# Patient Record
Sex: MALE | Race: WHITE
[De-identification: names, ages, dates, MRNs, and addresses within clinical notes are randomized per-mention and may not be internally consistent; named-entity substitution may affect disease eponyms.]

---

## 2021-12-07 ENCOUNTER — HOSPITAL ENCOUNTER (EMERGENCY)
Dept: HOSPITAL 41 - JD.ED | Age: 76
Discharge: HOME | End: 2021-12-07
Payer: COMMERCIAL

## 2021-12-07 DIAGNOSIS — Z79.899: ICD-10-CM

## 2021-12-07 DIAGNOSIS — I10: ICD-10-CM

## 2021-12-07 DIAGNOSIS — Z20.822: ICD-10-CM

## 2021-12-07 DIAGNOSIS — Z79.84: ICD-10-CM

## 2021-12-07 DIAGNOSIS — H49.22: Primary | ICD-10-CM

## 2021-12-07 DIAGNOSIS — E11.9: ICD-10-CM

## 2021-12-07 DIAGNOSIS — E78.00: ICD-10-CM

## 2021-12-07 DIAGNOSIS — E83.42: ICD-10-CM

## 2021-12-07 PROCEDURE — G0008 ADMIN INFLUENZA VIRUS VAC: HCPCS

## 2021-12-07 PROCEDURE — 85025 COMPLETE CBC W/AUTO DIFF WBC: CPT

## 2021-12-07 PROCEDURE — 70498 CT ANGIOGRAPHY NECK: CPT

## 2021-12-07 PROCEDURE — 85610 PROTHROMBIN TIME: CPT

## 2021-12-07 PROCEDURE — 93005 ELECTROCARDIOGRAM TRACING: CPT

## 2021-12-07 PROCEDURE — 71045 X-RAY EXAM CHEST 1 VIEW: CPT

## 2021-12-07 PROCEDURE — 70450 CT HEAD/BRAIN W/O DYE: CPT

## 2021-12-07 PROCEDURE — 83735 ASSAY OF MAGNESIUM: CPT

## 2021-12-07 PROCEDURE — 82553 CREATINE MB FRACTION: CPT

## 2021-12-07 PROCEDURE — 70544 MR ANGIOGRAPHY HEAD W/O DYE: CPT

## 2021-12-07 PROCEDURE — 85730 THROMBOPLASTIN TIME PARTIAL: CPT

## 2021-12-07 PROCEDURE — 82947 ASSAY GLUCOSE BLOOD QUANT: CPT

## 2021-12-07 PROCEDURE — 99284 EMERGENCY DEPT VISIT MOD MDM: CPT

## 2021-12-07 PROCEDURE — 87635 SARS-COV-2 COVID-19 AMP PRB: CPT

## 2021-12-07 PROCEDURE — 84484 ASSAY OF TROPONIN QUANT: CPT

## 2021-12-07 PROCEDURE — 83880 ASSAY OF NATRIURETIC PEPTIDE: CPT

## 2021-12-07 PROCEDURE — 86140 C-REACTIVE PROTEIN: CPT

## 2021-12-07 PROCEDURE — U0002 COVID-19 LAB TEST NON-CDC: HCPCS

## 2021-12-07 PROCEDURE — 36415 COLL VENOUS BLD VENIPUNCTURE: CPT

## 2021-12-07 PROCEDURE — 90694 VACC AIIV4 NO PRSRV 0.5ML IM: CPT

## 2021-12-07 PROCEDURE — 70551 MRI BRAIN STEM W/O DYE: CPT

## 2021-12-07 PROCEDURE — 90471 IMMUNIZATION ADMIN: CPT

## 2021-12-07 PROCEDURE — 80053 COMPREHEN METABOLIC PANEL: CPT

## 2022-09-29 ENCOUNTER — TRANSFERRED RECORDS (OUTPATIENT)
Dept: HEALTH INFORMATION MANAGEMENT | Facility: CLINIC | Age: 77
End: 2022-09-29

## 2022-10-26 ENCOUNTER — TRANSCRIBE ORDERS (OUTPATIENT)
Dept: OTHER | Age: 77
End: 2022-10-26

## 2022-10-26 DIAGNOSIS — R29.810 FACIAL WEAKNESS: Primary | ICD-10-CM

## 2022-12-01 DIAGNOSIS — R29.810 FACIAL WEAKNESS: Primary | ICD-10-CM

## 2022-12-11 NOTE — PROGRESS NOTES
[facility] Neurology Consultation    Marshal Navas MRN# [id number]   Age: 77 year old YOB: 1945     Requesting physician: Provider Not In System  [place] [facility]     Reason for Consultation: episodic double vision and dizziness      History of Presenting Symptoms:   Marshal Navas is a 77 year old male who presents today for evaluation of episodic double vision and dizziness.     In December 2021 patient woke up with acute onset double vision. He was found have bilateral VI nerve palsies by examination. MRI brain and lab work did not show an etiology of symptoms. He was given prism glasses. Symptoms improved over a few months.     In late September 2021 patient had acute onset of symptoms as follows. He woke up with severe dizziness and double vision. He was unable to sit up and he felt nausea, but he wasn't throwing up. He went to the ER in [facility]. He had right facial droop and abnormal eye movement's on exam. It is unclear how chronic facial droop is, however, as patient did not notice clear worsening. 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 He went a little better at discharge. The double vision improved in about 2 weeks. The dizziness has slowly improved over the last few months, but is not completely back to normal.     Dizziness is only with getting up and walking around. He feels lightheaded and a sense of imbalance with walking. He previously had more of a moving sensation, but not a spinning sensation. He doesn't feel like he's going to pass. He had a fall about a month ago. He used a walker for about 3 weeks in September and then was given a cane for about 3 weeks, but has been walking without assistance since then.    Patient has had decreased appetite over about the last 6 month and has lost about 50 pounds. This worsened after hospitalization. His appetite still hasn't returned.     He has cotton mouth sensation,  which makes it hard to swallow. He has hard swallowing things like pulled pork.     Patient reports he has longstanding neuropathy with numbness in the feet that started around chemotherapy for Hodgkin's Lymphoma in the late 90s. He was also diagnosed with diabetes around this time. He doesn't believe numbness has worsened with time. He had recurrence of Lymphoma about 5 years ago and was temporarily treated with Rituximab. He has not had a recurrence since then.       Past Medical History:   There is no problem list on file for this patient.    No past medical history on file.     Past Surgical History:   No past surgical history on file.     Social History:     Social History     Tobacco Use     Smoking status: Never     Smokeless tobacco: Never        Family History:   No family history on file.     Medications:     Current Outpatient Medications   Medication Sig     aspirin (ASA) 81 MG EC tablet Take 81 mg by mouth daily     levothyroxine (TIROSINT-SOL) 88 MCG/ML SOLN suspension Take 88 mcg by mouth daily     metFORMIN (GLUCOPHAGE) 500 MG tablet Take 500 mg by mouth 2 times daily (with meals)     Multiple Vitamin (MULTIVITAMIN PO)      tamsulosin (FLOMAX) 0.4 MG capsule Take 0.4 mg by mouth daily     No current facility-administered medications for this visit.        Allergies:   No Known Allergies     Review of Systems:   As above     Physical Exam:   Vitals: BP (!) 166/96   Pulse 103   SpO2 99%    BP sitting 144/86 HR 99 / BP standing 119/79  (2 minutes) / BP standing 129/85  (3-4 minutes)  General: Seated comfortably in no acute distress.  HEENT: Right optic discs sharp on funduscopic exam, left not well visualized.   Lungs: breathing comfortably  Extremities: no edema  Skin: No rashes on exposed  Neurologic:     Mental Status: Fully alert, attentive. Normal memory and fund of knowledge. Language normal, speech clear and fluent, no paraphasic errors.      Cranial Nerves: Visual fields intact.  PERRL. EOMI. Continuous right beating nystagmus on right horizontal gaze, more subtle left beating on left horizontal gaze. Facial sensation intact/symmetric. Right eyelid mildly drooping compared to left and right eyebrow mildly lowed at baseline, but full raise. Right eye closure mildly weak compared to left. Lower face is symmetric and strength intact. Hearing not formally tested but intact to conversation. Palate elevation symmetric, uvula midline. No dysarthria. Shoulder shrug strong bilaterally. Tongue protrusion midline.     Motor: No resting tremors, minimal postural/action tremors in upper extremities. Muscle tone normal throughout. Normal/symmetric rapid finger tapping. Strength 5/5 throughout upper and lower extremities as below.      Right Left   Shoulder abduction        5 5   Elbow extension 5 5   Elbow flexion 5 5   Wrist extension         5 5   Finger extension 5 5   ADM 5 5   FDI 5 5   APB 5 5   Hip flexion 5 5   Knee flexion 5 5   Knee extension 5 5   Dorsiflexion 5 5   Plantar flexion 5 5        Deep Tendon Reflexes:     Right Left   Biceps trace trace   BRD trace trace   Triceps 1 1   Patellar 1 1   Achilles trace trace   Plantar equivocal equivocal   Clonus Absent absent        Sensory: Intact/symmetric to proprioception throughout upper and lower extremities. Light touch is decreased in the bilateral toes. Vibration is absent in the bilateral great toes and left ankle, ~5 seconds at right ankle, ~12 seconds at thumb. Negative Romberg.      Coordination: Finger-nose-finger and heel-shin intact without dysmetria. Rapid alternating movements intact/symmetric with normal speed and rhythm.     Gait: Mildly cautious, but otherwise normal steady casual gait. Able to walk on toes, heels with mild difficulty. Able to take 2-3 tandem steps before stepping off.         Data: Pertinent prior to visit   12/7/2021 MRI brain: unremarkable  12/7/2021 CTA neck: unremarkable  12/13/2021 negative AChR blocking  and binding and modulating AB, MUSK Ab.  10/20/2021 A1C 7.3%, LDL 80.    2/1/2022 A1c 7.8%, LDL 68.    CSF 10/2022: protein 60, normal Cytology/Cytometry/paraneoplasticpanel/VDRL, WBC 7, glucose 99    10/2022 Lyme Ab negative, ganglioside Ab panel negative, AChR binding negative    MRI brain 10/2022  FINDINGS: No restricted diffusion to suggest acute ischemia. No abnormal magnetic susceptibility or enhancement. Minimal T2/FLAIR periventricular hyperintensities likely representing chronic small vessel ischemic change. Normal caliber ventricles. No CP angle mass. Normal orbits. Intracranial vascular flow voids are preserved. The paranasal sinuses and mastoid air cells are clear.   IMPRESSION: No acute intracranial abnormality.     MRI brain 9/2022  IMPRESSION: No acute intracranial abnormality.     CTA head 9/2022  IMPRESSION:   1. No significant vascular cutoff or aneurysm.   2. No significant stenosis within the intracranial and extracranial arterial circulation.   3. If further evaluation is needed, consider MRI.     CT chest 10/2022  1. No acute findings of the chest.     CT abdomen and pelvis 10/2022   1. No acute findings of the abdomen and pelvis.   2. Stable pelvic lymphadenopathy.     EMG 12/12/2022  Interpretation:  This is an abnormal examination. There is electrophysiologic evidence of the following:  - Mildly severe right sided ulnar mononeuropathy at the elbow  - Moderately severe right median mononeuropathy at the wrist (carpal tunnel syndrome)  - Mild non-length dependent sensory polyneuropathy  - Mild chronic inactive C5-C8 polyradiculopathy  - Mild chronic inactive L4-S1 polyradiculopathy  Comment: There is no electrophysiologic evidence of disorder of neuromuscular transmission as clinically questioned.          Assessment and Plan:   Assessment:  Marshal Navas is a 77 year old male with history of Hodgkin's lymphoma in remission who presents today for evaluation of episodic double vision and  dizziness. Patient had acute onset double vision in 12/2021 with examination suggestive of bilateral VI nerve palsies. Symptoms improved over a few months. In 9/2022 he had recurrence of double vision with dizziness and facial droop (unclear if facial droop was pre-existing). He was treated with 5 days of IVIG for possible Cortez Betancur variant. Patient has had improvement in symptoms over weeks to months, but is not completely back to baseline.     He has had extensive work-up as above including EMG today. Myasthenia antibodies have been negative and repetitive stimulation was unremarkable today. Due to acute nature of symptoms with improvement over several months, I would suspect inflammatory causes of polyneuropathy to be most likely explanation. AIDP variant such as Cortez Betancur syndrome is a consideration. Ganglioside antibodies were negative. Mild non-length dependent sensory polyneuropathy was seen on today's EMG, which can be seen in AIDP and it's variants. Causes of sensory neuropathy were also checked as below, but this is less likely. Diabetes can sometimes cause recurrent cranial mononeuropathies and this is a consideration as well. Regardless, it is reassuring symptoms are improving and I wouldn't recommend any treatment interventions besides continued therapy at this time. If symptoms recur, patient would likely need repeat testing including CSF sampling, MRI brain, and EMG.      Plan:  - Serum A1c, paraneoplastic panel, B6, B12, SSA, SSB, ESR, CRP, ELP, immunofixation  - Continue PT exercises  - Call with worsening symptoms    Follow up in Neurology clinic as needed should new concerns arise.    Brad Tracy MD   of Neurology  [facility]      The total time of this encounter today amounted to 100 minutes. This time included time spent with the patient, prep work, ordering tests, and performing post visit documentation.

## 2022-12-12 ENCOUNTER — PRE VISIT (OUTPATIENT)
Dept: NEUROLOGY | Facility: CLINIC | Age: 77
End: 2022-12-12

## 2022-12-12 ENCOUNTER — OFFICE VISIT (OUTPATIENT)
Dept: NEUROLOGY | Facility: CLINIC | Age: 77
End: 2022-12-12
Payer: COMMERCIAL

## 2022-12-12 VITALS — SYSTOLIC BLOOD PRESSURE: 166 MMHG | DIASTOLIC BLOOD PRESSURE: 96 MMHG | HEART RATE: 103 BPM | OXYGEN SATURATION: 99 %

## 2022-12-12 DIAGNOSIS — G62.9 NEUROPATHY: Primary | ICD-10-CM

## 2022-12-12 DIAGNOSIS — R29.810 FACIAL WEAKNESS: ICD-10-CM

## 2022-12-12 PROCEDURE — 99417 PROLNG OP E/M EACH 15 MIN: CPT | Performed by: INTERNAL MEDICINE

## 2022-12-12 PROCEDURE — 95911 NRV CNDJ TEST 9-10 STUDIES: CPT | Performed by: INTERNAL MEDICINE

## 2022-12-12 PROCEDURE — 99205 OFFICE O/P NEW HI 60 MIN: CPT | Performed by: INTERNAL MEDICINE

## 2022-12-12 PROCEDURE — 95886 MUSC TEST DONE W/N TEST COMP: CPT | Performed by: INTERNAL MEDICINE

## 2022-12-12 PROCEDURE — 95937 NEUROMUSCULAR JUNCTION TEST: CPT | Performed by: INTERNAL MEDICINE

## 2022-12-12 RX ORDER — TAMSULOSIN HYDROCHLORIDE 0.4 MG/1
0.4 CAPSULE ORAL DAILY
COMMUNITY

## 2022-12-12 ASSESSMENT — PAIN SCALES - GENERAL: PAINLEVEL: NO PAIN (0)

## 2022-12-12 NOTE — LETTER
12/12/2022       RE: Marshal Navas  [address] [place] [address]     Dear Colleague,    Thank you for referring your patient, Marshal Navas, to the [facility]. Please see a copy of my visit note below.    [facility] Neurology Consultation    Marshal Navas MRN# [id number]   Age: 77 year old YOB: 1945     Requesting physician: Provider Not In System  [facility] - [facility]     Reason for Consultation: episodic double vision and dizziness      History of Presenting Symptoms:   Marshal Navas is a 77 year old male who presents today for evaluation of episodic double vision and dizziness.     In December 2021 patient woke up with acute onset double vision. He was found have bilateral VI nerve palsies by examination. MRI brain and lab work did not show an etiology of symptoms. He was given prism glasses. Symptoms improved over a few months.     In late September 2021 patient had acute onset of symptoms as follows. He woke up with severe dizziness and double vision. He was unable to sit up and he felt nausea, but he wasn't throwing up. He went to the ER in [facility]. He had right facial droop and abnormal eye movement's on exam. It is unclear how chronic facial droop is, however, as patient did not notice clear worsening. 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 He went a little better at discharge. The double vision improved in about 2 weeks. The dizziness has slowly improved over the last few months, but is not completely back to normal.     Dizziness is only with getting up and walking around. He feels lightheaded and a sense of imbalance with walking. He previously had more of a moving sensation, but not a spinning sensation. He doesn't feel like he's going to pass. He had a fall about a month ago. He used a walker for about 3 weeks in  September and then was given a cane for about 3 weeks, but has been walking without assistance since then.    Patient has had decreased appetite over about the last 6 month and has lost about 50 pounds. This worsened after hospitalization. His appetite still hasn't returned.     He has cotton mouth sensation, which makes it hard to swallow. He has hard swallowing things like pulled pork.     Patient reports he has longstanding neuropathy with numbness in the feet that started around chemotherapy for Hodgkin's Lymphoma in the late 90s. He was also diagnosed with diabetes around this time. He doesn't believe numbness has worsened with time. He had recurrence of Lymphoma about 5 years ago and was temporarily treated with Rituximab. He has not had a recurrence since then.       Past Medical History:   There is no problem list on file for this patient.    No past medical history on file.     Past Surgical History:   No past surgical history on file.     Social History:     Social History     Tobacco Use     Smoking status: Never     Smokeless tobacco: Never        Family History:   No family history on file.     Medications:     Current Outpatient Medications   Medication Sig     aspirin (ASA) 81 MG EC tablet Take 81 mg by mouth daily     levothyroxine (TIROSINT-SOL) 88 MCG/ML SOLN suspension Take 88 mcg by mouth daily     metFORMIN (GLUCOPHAGE) 500 MG tablet Take 500 mg by mouth 2 times daily (with meals)     Multiple Vitamin (MULTIVITAMIN PO)      tamsulosin (FLOMAX) 0.4 MG capsule Take 0.4 mg by mouth daily     No current facility-administered medications for this visit.        Allergies:   No Known Allergies     Review of Systems:   As above     Physical Exam:   Vitals: BP (!) 166/96   Pulse 103   SpO2 99%    BP sitting 144/86 HR 99 / BP standing 119/79  (2 minutes) / BP standing 129/85  (3-4 minutes)  General: Seated comfortably in no acute distress.  HEENT: Right optic discs sharp on funduscopic exam,  left not well visualized.   Lungs: breathing comfortably  Extremities: no edema  Skin: No rashes on exposed  Neurologic:     Mental Status: Fully alert, attentive. Normal memory and fund of knowledge. Language normal, speech clear and fluent, no paraphasic errors.      Cranial Nerves: Visual fields intact. PERRL. EOMI. Continuous right beating nystagmus on right horizontal gaze, more subtle left beating on left horizontal gaze. Facial sensation intact/symmetric. Right eyelid mildly drooping compared to left and right eyebrow mildly lowed at baseline, but full raise. Right eye closure mildly weak compared to left. Lower face is symmetric and strength intact. Hearing not formally tested but intact to conversation. Palate elevation symmetric, uvula midline. No dysarthria. Shoulder shrug strong bilaterally. Tongue protrusion midline.     Motor: No resting tremors, minimal postural/action tremors in upper extremities. Muscle tone normal throughout. Normal/symmetric rapid finger tapping. Strength 5/5 throughout upper and lower extremities as below.      Right Left   Shoulder abduction        5 5   Elbow extension 5 5   Elbow flexion 5 5   Wrist extension         5 5   Finger extension 5 5   ADM 5 5   FDI 5 5   APB 5 5   Hip flexion 5 5   Knee flexion 5 5   Knee extension 5 5   Dorsiflexion 5 5   Plantar flexion 5 5        Deep Tendon Reflexes:     Right Left   Biceps trace trace   BRD trace trace   Triceps 1 1   Patellar 1 1   Achilles trace trace   Plantar equivocal equivocal   Clonus Absent absent        Sensory: Intact/symmetric to proprioception throughout upper and lower extremities. Light touch is decreased in the bilateral toes. Vibration is absent in the bilateral great toes and left ankle, ~5 seconds at right ankle, ~12 seconds at thumb. Negative Romberg.      Coordination: Finger-nose-finger and heel-shin intact without dysmetria. Rapid alternating movements intact/symmetric with normal speed and rhythm.      Gait: Mildly cautious, but otherwise normal steady casual gait. Able to walk on toes, heels with mild difficulty. Able to take 2-3 tandem steps before stepping off.         Data: Pertinent prior to visit   12/7/2021 MRI brain: unremarkable  12/7/2021 CTA neck: unremarkable  12/13/2021 negative AChR blocking and binding and modulating AB, MUSK Ab.  10/20/2021 A1C 7.3%, LDL 80.    2/1/2022 A1c 7.8%, LDL 68.    CSF 10/2022: protein 60, normal Cytology/Cytometry/paraneoplasticpanel/VDRL, WBC 7, glucose 99    10/2022 Lyme Ab negative, ganglioside Ab panel negative, AChR binding negative    MRI brain 10/2022  FINDINGS: No restricted diffusion to suggest acute ischemia. No abnormal magnetic susceptibility or enhancement. Minimal T2/FLAIR periventricular hyperintensities likely representing chronic small vessel ischemic change. Normal caliber ventricles. No CP angle mass. Normal orbits. Intracranial vascular flow voids are preserved. The paranasal sinuses and mastoid air cells are clear.   IMPRESSION: No acute intracranial abnormality.     MRI brain 9/2022  IMPRESSION: No acute intracranial abnormality.     CTA head 9/2022  IMPRESSION:   1. No significant vascular cutoff or aneurysm.   2. No significant stenosis within the intracranial and extracranial arterial circulation.   3. If further evaluation is needed, consider MRI.     CT chest 10/2022  1. No acute findings of the chest.     CT abdomen and pelvis 10/2022   1. No acute findings of the abdomen and pelvis.   2. Stable pelvic lymphadenopathy.     EMG 12/12/2022  Interpretation:  This is an abnormal examination. There is electrophysiologic evidence of the following:  - Mildly severe right sided ulnar mononeuropathy at the elbow  - Moderately severe right median mononeuropathy at the wrist (carpal tunnel syndrome)  - Mild non-length dependent sensory polyneuropathy  - Mild chronic inactive C5-C8 polyradiculopathy  - Mild chronic inactive L4-S1  polyradiculopathy  Comment: There is no electrophysiologic evidence of disorder of neuromuscular transmission as clinically questioned.          Assessment and Plan:   Assessment:  Marshal Navas is a 77 year old male with history of Hodgkin's lymphoma in remission who presents today for evaluation of episodic double vision and dizziness. Patient had acute onset double vision in 12/2021 with examination suggestive of bilateral VI nerve palsies. Symptoms improved over a few months. In 9/2022 he had recurrence of double vision with dizziness and facial droop (unclear if facial droop was pre-existing). He was treated with 5 days of IVIG for possible Cortez Betancur variant. Patient has had improvement in symptoms over weeks to months, but is not completely back to baseline.     He has had extensive work-up as above including EMG today. Myasthenia antibodies have been negative and repetitive stimulation was unremarkable today. Due to acute nature of symptoms with improvement over several months, I would suspect inflammatory causes of polyneuropathy to be most likely explanation. AIDP variant such as Cortez Betancur syndrome is a consideration. Ganglioside antibodies were negative. Mild non-length dependent sensory polyneuropathy was seen on today's EMG, which can be seen in AIDP and it's variants. Causes of sensory neuropathy were also checked as below, but this is less likely. Diabetes can sometimes cause recurrent cranial mononeuropathies and this is a consideration as well. Regardless, it is reassuring symptoms are improving and I wouldn't recommend any treatment interventions besides continued therapy at this time. If symptoms recur, patient would likely need repeat testing including CSF sampling, MRI brain, and EMG.      Plan:  - Serum A1c, paraneoplastic panel, B6, B12, SSA, SSB, ESR, CRP, ELP, immunofixation  - Continue PT exercises  - Call with worsening symptoms    Follow up in Neurology clinic as needed should  new concerns arise.      Brad Tracy MD   of Neurology  [facility]    The total time of this encounter today amounted to 100 minutes. This time included time spent with the patient, prep work, ordering tests, and performing post visit documentation.

## 2022-12-12 NOTE — PROGRESS NOTES
[facility]  Electrodiagnostic Laboratory                 Department of Neurology                                                                                                         Test Date:  2022    Patient: Marshal Navas : 1945 Physician: Brad Tracy MD   Sex: Male AGE: 77 year Ref Phys:    ID#: [id number]   Technician: Kristy Behling     Clinical Information:  Patient is a 78 y/o male who presents for evaluation of episodic double vision and imbalance. EMG ordered to evaluate for polyneuropathy and disorder of neuromuscular transmission.     Techniques:  Motor and sensory conduction studies were done with surface recording electrodes. EMG was done with a concentric needle electrode.     Results:  Evaluation of the right median-APB motor study showed prolonged distal latency and reduced amplitude. The right ulnar-ADM showed reduced conduction velocity across the elbow segment. The bilateral radial sensory responses showed mildly decreased amplitudes.     F Wave studies indicate that the right ulnar F wave has no response.  All remaining F Wave latencies were within normal limits.      Repetitive nerve stimulation of the right facial-nasalis and right ulnar-ADM were within normal limits.     Needle evaluation of the right deltoid muscle showed increased insertional activity and slightly increased motor unit duration.  The right Biceps Brachii, the right Vastus Lateralis, and the right Gluteus Medius muscles showed slightly increased motor unit amplitude and slightly increased motor unit duration.  The right Triceps Brachii and the right Gastrocnemius muscles showed slightly increased motor unit amplitude, slightly increased motor unit duration, and recruitment.  The right Ext Digitorum Communis muscle showed slightly increased motor unit duration.  The right First Dorsal Interosseous muscle showed moderately increased motor unit amplitude, moderately increased  motor unit duration, and recruitment.  The right Tibialis Anterior muscle showed slightly increased motor unit amplitude, moderately increased motor unit duration, and recruitment.  All remaining muscles (as indicated in the following table) showed no evidence of electrical instability.        Interpretation:  This is an abnormal examination. 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   ___________________________  Brad Tracy MD        Nerve Conduction Studies  Motor Sites      Latency Amplitude Neg. Amp Diff Segment Distance Velocity Neg. Dur Neg Area Diff Temperature Comment   Site (ms) Norm (mV) Norm %  cm m/s Norm ms %  C    Right Fibular (EDB) Motor   Ankle 3.6  < 6.0 3.5  > 2.0  Ankle-EDB 8   4.9  30.6    Bel Fib Head 12.0 - 2.2 - -37.1 Bel Fib Head-Ankle 40 48  > 38 5.8 -21.1 30.7    Pop Fossa 14.1 - 2.5 - 13.6 Pop Fossa-Bel Fib Head 8 38  > 38 5.9 8.5 30.7    Right Median (APB) Motor   Wrist 4.7  < 4.4 4.3  > 5.0  Wrist-APB 8   7.7  28.1    Elbow 9.4 - 4.4  < 54.0 2.3 Elbow-Wrist 24 51  > 48 7.0 -8.8 28    Right Tibial (AHB) Motor   Ankle 4.0  < 6.5 6.6  > 4.4  Ankle-AHB 8   6.6  30.9    Knee 14.2 - 4.2 - -36.4 Knee-Ankle 42 41  > 38 7.2 -25.8 30.8    Right Ulnar (ADM) Motor   Wrist 3.4  < 3.5 5.6  > 5.0  Wrist-ADM 8   6.5  27.6    Bel Elbow 8.0 - 5.0 - -10.7 Bel Elbow-Wrist 24 52  > 48 6.9 -8.5 27.5    Abv Elbow 11.0 - 4.9 - -2.0 Abv Elbow-Bel Elbow 10 33  > 48 7.7 0.49 27.6      Sensory Sites      Onset Lat Peak Lat Amp (O-P) Amp (P-P) Segment Distance Velocity Temperature  Comment   Site ms ms  V Norm  V  cm m/s Norm  C    Right Median Sensory   Wrist-Dig II 2.8 3.5 10  > 10 15 Wrist-Dig II 14 50  > 48 28.3    Left Radial Sensory   Forearm-Wrist 1.40 2.2 14  > 15 24 Forearm-Wrist 10 71 - 24.3    Right Radial Sensory   Forearm-Wrist 1.65 2.5 14  > 15 16 Forearm-Wrist 10 61 - 28.3    Right Superficial Fibular Sensory   14 cm-Ankle 2.9 3.7 4  > 3 4 14 cm-Ankle 12.5 43  > 38 30.8    Right Sural Sensory   Calf-Lat Mall 2.8 3.6 5  > 5 5 Calf-Lat Mall 14 50  > 38 31.3    Right Ulnar Sensory   Wrist-Dig V 2.6 3.8 8  > 8 10 Wrist-Dig V 12.5 48  > 48 28.4      F Wave Studies     Min-F Max-F Dispersion Persistence Mean-F F-Norm L-R Mean-F L-R Mean-F Norm F/M Ratio F-M Lat (ms)   Right Tibial (Abd Hallucis)  30.9  C   57.19 61.25 4.06 100.00 59.80 <61  <5.7 1.36 50.47   Right Ulnar (Abd Dig Min)  28.1  C      0.00  <36  <2.5       RNS     Trial # Label Amp 1 (mV)  O-P Amp 4 (mV)  O-P Amp % Dif Area 1 (mV ms) Area 4 (mV ms) Area % Dif Rep Rate Train Length Pause Time (min:sec) Comments   Right Abductor Digiti Minimi   Tr 1 Baseline 5.88 5.94 1.1 26.49 24.86 -6.1 3.00 6 00:30    Tr 2 Post Exercise 6.58 6.37 -3.1 28.98 26.35 -9.1 3.00 6 01:00    Tr 3 1 min Post 6.78 6.33 -6.5 31.01 27.44 -11.5 3.00 6 01:00    Tr 4 2 min Post 6.71 6.23 -7.2 29.90 26.98 -9.8 3.00 6 01:00    5 3 min Post       3.00 6 00:00    Right Nasalis   Tr 1 Baseline 1.27 1.36 7.5 4.45 4.48 0.6 3.00 6 00:30    Tr 2 Post Exercise 1.48 1.52 2.5 4.66 4.32 -7.2 3.00 6 01:00    Tr 3 1 min Post 1.41 1.53 8.5 5.16 4.76 -7.6 3.00 6 01:00    Tr 4 2 min Post 1.41 1.35 -4.3 5.04 4.96 -1.6 3.00 6 01:00    5 3 min Post       3.00 6 00:00        Electromyography     Side Muscle Ins Act Fibs/PSW Fasc HF Amp Dur Poly Recrt Int Pat   Right Deltoid Incr None Nml 0 Nml 1+ 0 Nml Nml   Right Biceps Nml None Nml 0 1+ 1+ 0 Nml Nml   Right Triceps Nml None Nml 0 1+ 1+ 0 Fartun Nml   Right EDC Nml None Nml 0 Nml 1+ 0 Nml Nml   Right FDI Nml None Nml 0 2+  2+ 0 Fartun Nml   Right Tib Anterior Nml None Nml 0 1+ 2+ 0 Fartun Nml   Right Gastroc Nml None Nml 0 1+ 1+ 0 Fartun Nml   Right Vastus Lat Nml None Nml 0 1+ 1+ 0 Nml Nml   Right Iliopsoas Nml None Nml 0 Nml Nml 0 Nml Nml   Right Gluteus Med Nml None Nml 0 1+ 1+ 0 Nml Nml         NCS Waveforms:    Motor                Sensory

## 2022-12-12 NOTE — LETTER
2022       RE: Marshal Navas  [address] [address]     Dear Colleague,    Thank you for referring your patient, Marshal Navas, to the [facility] EMG [facility] [place] at [facility]. Please see a copy of my visit note below.                        [facility]  Electrodiagnostic Laboratory                 Department of Neurology                                                                                                         Test Date:  2022    Patient: Marshal Navas : 1945 Physician: Brad Tracy MD   Sex: Male AGE: 77 year Ref Phys:    ID#: [id number]   Technician: Kristy Behling     Clinical Information:  Patient is a 78 y/o male who presents for evaluation of episodic double vision and imbalance. EMG ordered to evaluate for polyneuropathy and disorder of neuromuscular transmission.     Techniques:  Motor and sensory conduction studies were done with surface recording electrodes. EMG was done with a concentric needle electrode.     Results:  Evaluation of the right median-APB motor study showed prolonged distal latency and reduced amplitude. The right ulnar-ADM showed reduced conduction velocity across the elbow segment. The bilateral radial sensory responses showed mildly decreased amplitudes.     F Wave studies indicate that the right ulnar F wave has no response.  All remaining F Wave latencies were within normal limits.      Repetitive nerve stimulation of the right facial-nasalis and right ulnar-ADM were within normal limits.     Needle evaluation of the right deltoid muscle showed increased insertional activity and slightly increased motor unit duration.  The right Biceps Brachii, the right Vastus Lateralis, and the right Gluteus Medius muscles showed slightly increased motor unit amplitude and slightly increased motor unit duration.  The right Triceps Brachii and the right Gastrocnemius muscles  showed slightly increased motor unit amplitude, slightly increased motor unit duration, and recruitment.  The right Ext Digitorum Communis muscle showed slightly increased motor unit duration.  The right First Dorsal Interosseous muscle showed moderately increased motor unit amplitude, moderately increased motor unit duration, and recruitment.  The right Tibialis Anterior muscle showed slightly increased motor unit amplitude, moderately increased motor unit duration, and recruitment.  All remaining muscles (as indicated in the following table) showed no evidence of electrical instability.        Interpretation:  This is an abnormal examination. 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   ___________________________  Brad Tracy MD        Nerve Conduction Studies  Motor Sites      Latency Amplitude Neg. Amp Diff Segment Distance Velocity Neg. Dur Neg Area Diff Temperature Comment   Site (ms) Norm (mV) Norm %  cm m/s Norm ms %  C    Right Fibular (EDB) Motor   Ankle 3.6  < 6.0 3.5  > 2.0  Ankle-EDB 8   4.9  30.6    Bel Fib Head 12.0 - 2.2 - -37.1 Bel Fib Head-Ankle 40 48  > 38 5.8 -21.1 30.7    Pop Fossa 14.1 - 2.5 - 13.6 Pop Fossa-Bel Fib Head 8 38  > 38 5.9 8.5 30.7    Right Median (APB) Motor   Wrist 4.7  < 4.4 4.3  > 5.0  Wrist-APB 8   7.7  28.1    Elbow 9.4 - 4.4  < 54.0 2.3 Elbow-Wrist 24 51  > 48 7.0 -8.8 28    Right Tibial (AHB) Motor   Ankle 4.0  < 6.5 6.6  > 4.4  Ankle-AHB 8   6.6  30.9    Knee 14.2 - 4.2 - -36.4 Knee-Ankle 42 41  > 38 7.2 -25.8 30.8    Right Ulnar (ADM) Motor    Wrist 3.4  < 3.5 5.6  > 5.0  Wrist-ADM 8   6.5  27.6    Bel Elbow 8.0 - 5.0 - -10.7 Bel Elbow-Wrist 24 52  > 48 6.9 -8.5 27.5    Abv Elbow 11.0 - 4.9 - -2.0 Abv Elbow-Bel Elbow 10 33  > 48 7.7 0.49 27.6      Sensory Sites      Onset Lat Peak Lat Amp (O-P) Amp (P-P) Segment Distance Velocity Temperature Comment   Site ms ms  V Norm  V  cm m/s Norm  C    Right Median Sensory   Wrist-Dig II 2.8 3.5 10  > 10 15 Wrist-Dig II 14 50  > 48 28.3    Left Radial Sensory   Forearm-Wrist 1.40 2.2 14  > 15 24 Forearm-Wrist 10 71 - 24.3    Right Radial Sensory   Forearm-Wrist 1.65 2.5 14  > 15 16 Forearm-Wrist 10 61 - 28.3    Right Superficial Fibular Sensory   14 cm-Ankle 2.9 3.7 4  > 3 4 14 cm-Ankle 12.5 43  > 38 30.8    Right Sural Sensory   Calf-Lat Mall 2.8 3.6 5  > 5 5 Calf-Lat Mall 14 50  > 38 31.3    Right Ulnar Sensory   Wrist-Dig V 2.6 3.8 8  > 8 10 Wrist-Dig V 12.5 48  > 48 28.4      F Wave Studies     Min-F Max-F Dispersion Persistence Mean-F F-Norm L-R Mean-F L-R Mean-F Norm F/M Ratio F-M Lat (ms)   Right Tibial (Abd Hallucis)  30.9  C   57.19 61.25 4.06 100.00 59.80 <61  <5.7 1.36 50.47   Right Ulnar (Abd Dig Min)  28.1  C      0.00  <36  <2.5       RNS     Trial # Label Amp 1 (mV)  O-P Amp 4 (mV)  O-P Amp % Dif Area 1 (mV ms) Area 4 (mV ms) Area % Dif Rep Rate Train Length Pause Time (min:sec) Comments   Right Abductor Digiti Minimi   Tr 1 Baseline 5.88 5.94 1.1 26.49 24.86 -6.1 3.00 6 00:30    Tr 2 Post Exercise 6.58 6.37 -3.1 28.98 26.35 -9.1 3.00 6 01:00    Tr 3 1 min Post 6.78 6.33 -6.5 31.01 27.44 -11.5 3.00 6 01:00    Tr 4 2 min Post 6.71 6.23 -7.2 29.90 26.98 -9.8 3.00 6 01:00    5 3 min Post       3.00 6 00:00    Right Nasalis   Tr 1 Baseline 1.27 1.36 7.5 4.45 4.48 0.6 3.00 6 00:30    Tr 2 Post Exercise 1.48 1.52 2.5 4.66 4.32 -7.2 3.00 6 01:00    Tr 3 1 min Post 1.41 1.53 8.5 5.16 4.76 -7.6 3.00 6 01:00    Tr 4 2 min Post 1.41 1.35 -4.3 5.04 4.96 -1.6 3.00 6 01:00    5 3 min Post       3.00 6 00:00         Electromyography     Side Muscle Ins Act Fibs/PSW Fasc HF Amp Dur Poly Recrt Int Pat   Right Deltoid Incr None Nml 0 Nml 1+ 0 Nml Nml   Right Biceps Nml None Nml 0 1+ 1+ 0 Nml Nml   Right Triceps Nml None Nml 0 1+ 1+ 0 Fartun Nml   Right EDC Nml None Nml 0 Nml 1+ 0 Nml Nml   Right FDI Nml None Nml 0 2+ 2+ 0 Fartun Nml   Right Tib Anterior Nml None Nml 0 1+ 2+ 0 Fartun Nml   Right Gastroc Nml None Nml 0 1+ 1+ 0 Fartun Nml   Right Vastus Lat Nml None Nml 0 1+ 1+ 0 Nml Nml   Right Iliopsoas Nml None Nml 0 Nml Nml 0 Nml Nml   Right Gluteus Med Nml None Nml 0 1+ 1+ 0 Nml Nml         NCS Waveforms:    Motor                Sensory                                               Sincerely,    Brad Tracy MD

## 2023-01-04 ENCOUNTER — TELEPHONE (OUTPATIENT)
Dept: NEUROLOGY | Facility: CLINIC | Age: 78
End: 2023-01-04

## 2023-01-04 NOTE — TELEPHONE ENCOUNTER
[facility] Call Center    Phone Message    May a detailed message be left on voicemail: yes     Reason for Call: Other: [facility] called to [facility]FS ( return for service) be sent to [facility] for the Pt to be seen at   [facility]  [email or web address] [email or web address] [facility] [place] [address]  [phone]    Please fax to [facility] : Fax # [phone] Att: [facility] Dept    [facility] and need to be followed up by neurology, but [place] is too far for Pt.    Please call Monica back at [phone] with any questions.         Action Taken: Message routed to:  [facility] ([facility] Neurology    Travel Screening: Not Applicable

## 2023-01-05 ENCOUNTER — CARE COORDINATION (OUTPATIENT)
Dept: NEUROLOGY | Facility: CLINIC | Age: 78
End: 2023-01-05

## 2023-04-12 ENCOUNTER — HOSPITAL ENCOUNTER (OUTPATIENT)
Dept: HOSPITAL 41 - JD.SDS | Age: 78
Discharge: HOME | End: 2023-04-12
Attending: SURGERY
Payer: OTHER GOVERNMENT

## 2023-04-12 DIAGNOSIS — K22.89: ICD-10-CM

## 2023-04-12 DIAGNOSIS — Z79.890: ICD-10-CM

## 2023-04-12 DIAGNOSIS — E11.42: ICD-10-CM

## 2023-04-12 DIAGNOSIS — K25.9: ICD-10-CM

## 2023-04-12 DIAGNOSIS — K20.90: ICD-10-CM

## 2023-04-12 DIAGNOSIS — K29.80: ICD-10-CM

## 2023-04-12 DIAGNOSIS — K44.9: ICD-10-CM

## 2023-04-12 DIAGNOSIS — E03.9: ICD-10-CM

## 2023-04-12 DIAGNOSIS — K22.81: ICD-10-CM

## 2023-04-12 DIAGNOSIS — D12.3: Primary | ICD-10-CM

## 2023-04-12 DIAGNOSIS — K29.50: ICD-10-CM

## 2023-04-12 DIAGNOSIS — Z86.010: ICD-10-CM

## 2023-04-12 DIAGNOSIS — K64.8: ICD-10-CM

## 2023-04-12 DIAGNOSIS — Z79.84: ICD-10-CM

## 2023-04-12 DIAGNOSIS — E78.5: ICD-10-CM

## 2023-04-12 PROCEDURE — 82947 ASSAY GLUCOSE BLOOD QUANT: CPT

## 2023-04-12 PROCEDURE — 43239 EGD BIOPSY SINGLE/MULTIPLE: CPT

## 2023-04-12 PROCEDURE — 80051 ELECTROLYTE PANEL: CPT

## 2023-04-12 PROCEDURE — 36415 COLL VENOUS BLD VENIPUNCTURE: CPT

## 2023-04-12 PROCEDURE — 85025 COMPLETE CBC W/AUTO DIFF WBC: CPT

## 2023-04-12 PROCEDURE — 45380 COLONOSCOPY AND BIOPSY: CPT

## 2023-04-12 PROCEDURE — 88305 TISSUE EXAM BY PATHOLOGIST: CPT

## 2024-07-03 ENCOUNTER — HOSPITAL ENCOUNTER (OUTPATIENT)
Dept: HOSPITAL 41 - JD.SDS | Age: 79
Discharge: HOME | End: 2024-07-03
Attending: SURGERY
Payer: OTHER GOVERNMENT

## 2024-07-03 DIAGNOSIS — E78.5: ICD-10-CM

## 2024-07-03 DIAGNOSIS — E03.9: ICD-10-CM

## 2024-07-03 DIAGNOSIS — K26.9: ICD-10-CM

## 2024-07-03 DIAGNOSIS — Z12.11: Primary | ICD-10-CM

## 2024-07-03 DIAGNOSIS — K63.5: ICD-10-CM

## 2024-07-03 DIAGNOSIS — K25.9: ICD-10-CM

## 2024-07-03 DIAGNOSIS — Z79.84: ICD-10-CM

## 2024-07-03 DIAGNOSIS — K44.9: ICD-10-CM

## 2024-07-03 DIAGNOSIS — Z79.890: ICD-10-CM

## 2024-07-03 DIAGNOSIS — K29.70: ICD-10-CM

## 2024-07-03 DIAGNOSIS — E11.9: ICD-10-CM

## 2024-07-03 DIAGNOSIS — I10: ICD-10-CM

## 2024-07-03 DIAGNOSIS — D12.3: ICD-10-CM

## 2024-07-03 DIAGNOSIS — Z86.010: ICD-10-CM

## 2024-07-03 DIAGNOSIS — Z79.899: ICD-10-CM

## 2024-07-03 DIAGNOSIS — K62.89: ICD-10-CM

## 2024-07-03 DIAGNOSIS — Z79.82: ICD-10-CM

## 2024-07-03 PROCEDURE — 43239 EGD BIOPSY SINGLE/MULTIPLE: CPT

## 2024-07-03 PROCEDURE — 45380 COLONOSCOPY AND BIOPSY: CPT
